# Patient Record
Sex: MALE | Race: OTHER | NOT HISPANIC OR LATINO | URBAN - METROPOLITAN AREA
[De-identification: names, ages, dates, MRNs, and addresses within clinical notes are randomized per-mention and may not be internally consistent; named-entity substitution may affect disease eponyms.]

---

## 2024-05-20 ENCOUNTER — OUTPATIENT (OUTPATIENT)
Dept: OUTPATIENT SERVICES | Facility: HOSPITAL | Age: 19
LOS: 1 days | End: 2024-05-20

## 2024-05-20 VITALS
DIASTOLIC BLOOD PRESSURE: 87 MMHG | OXYGEN SATURATION: 98 % | TEMPERATURE: 98 F | WEIGHT: 205.03 LBS | HEART RATE: 78 BPM | SYSTOLIC BLOOD PRESSURE: 137 MMHG | HEIGHT: 66 IN | RESPIRATION RATE: 16 BRPM

## 2024-05-20 DIAGNOSIS — M26.02 MAXILLARY HYPOPLASIA: ICD-10-CM

## 2024-05-20 DIAGNOSIS — Z90.89 ACQUIRED ABSENCE OF OTHER ORGANS: Chronic | ICD-10-CM

## 2024-05-20 DIAGNOSIS — M26.03 MANDIBULAR HYPERPLASIA: ICD-10-CM

## 2024-05-20 LAB
BLD GP AB SCN SERPL QL: NEGATIVE — SIGNIFICANT CHANGE UP
HCT VFR BLD CALC: 46.7 % — SIGNIFICANT CHANGE UP (ref 39–50)
HGB BLD-MCNC: 16.2 G/DL — SIGNIFICANT CHANGE UP (ref 13–17)
MANUAL SMEAR VERIFICATION: SIGNIFICANT CHANGE UP
MCHC RBC-ENTMCNC: 28.9 PG — SIGNIFICANT CHANGE UP (ref 27–34)
MCHC RBC-ENTMCNC: 34.7 GM/DL — SIGNIFICANT CHANGE UP (ref 32–36)
MCV RBC AUTO: 83.2 FL — SIGNIFICANT CHANGE UP (ref 80–100)
NRBC # BLD: 0 /100 WBCS — SIGNIFICANT CHANGE UP (ref 0–0)
NRBC # FLD: 0 K/UL — SIGNIFICANT CHANGE UP (ref 0–0)
PLAT MORPH BLD: ABNORMAL
PLATELET # BLD AUTO: SIGNIFICANT CHANGE UP K/UL (ref 150–400)
PLATELET CLUMP BLD QL SMEAR: ABNORMAL
PLATELET COUNT - ESTIMATE: ABNORMAL
RBC # BLD: 5.61 M/UL — SIGNIFICANT CHANGE UP (ref 4.2–5.8)
RBC # FLD: 12.1 % — SIGNIFICANT CHANGE UP (ref 10.3–14.5)
RBC BLD AUTO: SIGNIFICANT CHANGE UP
RH IG SCN BLD-IMP: POSITIVE — SIGNIFICANT CHANGE UP
RH IG SCN BLD-IMP: POSITIVE — SIGNIFICANT CHANGE UP
WBC # BLD: 6.9 K/UL — SIGNIFICANT CHANGE UP (ref 3.8–10.5)
WBC # FLD AUTO: 6.9 K/UL — SIGNIFICANT CHANGE UP (ref 3.8–10.5)

## 2024-05-20 NOTE — H&P PST ADULT - NSANTHOSAYNRD_GEN_A_CORE
No. KAMRYN screening performed.  STOP BANG Legend: 0-2 = LOW Risk; 3-4 = INTERMEDIATE Risk; 5-8 = HIGH Risk

## 2024-05-20 NOTE — H&P PST ADULT - HISTORY OF PRESENT ILLNESS
18 yr old male with preop dx of mandibular hyperplasia, maxillary hypoplasia presents to have PSt eval for Maxillary lefort 1 osteotomy, Bilateral sagittal split osteotomies.

## 2024-05-20 NOTE — H&P PST ADULT - PROBLEM SELECTOR PLAN 1
Scheduled for Maxillary lefort 1 Osteotomy, Bilateral sagittal split osteotomies on 5/29/24.  Pre-op instructions provided. Pt given verbal and written instructions with teach back on chlorhexidine shampoo and pepcid. Pt verbalized understanding with return demonstration. Scheduled for Maxillary lefort 1 Osteotomy, Bilateral sagittal split osteotomies on 5/29/24.  Pre-op instructions provided. Pt given verbal and written instructions with teach back on chlorhexidine shampoo and pepcid. Pt verbalized understanding with return demonstration.  CBC, TYPE/ABO done and results pending.

## 2024-05-20 NOTE — H&P PST ADULT - ENMT COMMENTS
denies loose teeth, No loose teeth, Mallampati II, Preop dx Mandibular Hyperplasia, Maxillary Hypoplasia No loose teeth, Mallampati III, Preop dx Mandibular Hyperplasia, Maxillary Hypoplasia

## 2024-05-29 ENCOUNTER — INPATIENT (INPATIENT)
Facility: HOSPITAL | Age: 19
LOS: 0 days | Discharge: HOME | End: 2024-05-30
Attending: ORAL & MAXILLOFACIAL SURGERY | Admitting: ORAL & MAXILLOFACIAL SURGERY

## 2024-05-29 VITALS
RESPIRATION RATE: 16 BRPM | HEART RATE: 80 BPM | WEIGHT: 205.03 LBS | SYSTOLIC BLOOD PRESSURE: 134 MMHG | TEMPERATURE: 98 F | DIASTOLIC BLOOD PRESSURE: 91 MMHG | OXYGEN SATURATION: 99 % | HEIGHT: 66 IN

## 2024-05-29 DIAGNOSIS — Z90.89 ACQUIRED ABSENCE OF OTHER ORGANS: Chronic | ICD-10-CM

## 2024-05-29 DIAGNOSIS — M26.02 MAXILLARY HYPOPLASIA: ICD-10-CM

## 2024-05-29 PROBLEM — Z87.09 PERSONAL HISTORY OF OTHER DISEASES OF THE RESPIRATORY SYSTEM: Chronic | Status: ACTIVE | Noted: 2024-05-20

## 2024-05-29 PROBLEM — M26.03 MANDIBULAR HYPERPLASIA: Chronic | Status: ACTIVE | Noted: 2024-05-20

## 2024-05-29 RX ORDER — OXYCODONE HYDROCHLORIDE 5 MG/1
5 TABLET ORAL EVERY 4 HOURS
Refills: 0 | Status: DISCONTINUED | OUTPATIENT
Start: 2024-05-29 | End: 2024-05-30

## 2024-05-29 RX ORDER — ONABOTULINUMTOXINA 100 UNIT
200 VIAL (EA) INJECTION ONCE
Refills: 0 | Status: DISCONTINUED | OUTPATIENT
Start: 2024-05-29 | End: 2024-05-30

## 2024-05-29 RX ORDER — ACETAMINOPHEN 500 MG
650 TABLET ORAL EVERY 6 HOURS
Refills: 0 | Status: DISCONTINUED | OUTPATIENT
Start: 2024-05-29 | End: 2024-05-30

## 2024-05-29 RX ORDER — SODIUM CHLORIDE 0.65 %
1 AEROSOL, SPRAY (ML) NASAL
Refills: 0 | Status: DISCONTINUED | OUTPATIENT
Start: 2024-05-29 | End: 2024-05-30

## 2024-05-29 RX ORDER — AMOXICILLIN 250 MG/5ML
35 SUSPENSION, RECONSTITUTED, ORAL (ML) ORAL
Qty: 3 | Refills: 0
Start: 2024-05-29 | End: 2024-06-04

## 2024-05-29 RX ORDER — MORPHINE SULFATE 50 MG/1
2 CAPSULE, EXTENDED RELEASE ORAL ONCE
Refills: 0 | Status: DISCONTINUED | OUTPATIENT
Start: 2024-05-29 | End: 2024-05-30

## 2024-05-29 RX ORDER — FLUTICASONE PROPIONATE 50 MCG
1 SPRAY, SUSPENSION NASAL
Qty: 0 | Refills: 0 | DISCHARGE
Start: 2024-05-29

## 2024-05-29 RX ORDER — IBUPROFEN 200 MG
30 TABLET ORAL
Qty: 840 | Refills: 0
Start: 2024-05-29 | End: 2024-06-04

## 2024-05-29 RX ORDER — SODIUM CHLORIDE 9 MG/ML
1000 INJECTION, SOLUTION INTRAVENOUS
Refills: 0 | Status: DISCONTINUED | OUTPATIENT
Start: 2024-05-29 | End: 2024-05-30

## 2024-05-29 RX ORDER — METOCLOPRAMIDE HCL 10 MG
10 TABLET ORAL ONCE
Refills: 0 | Status: DISCONTINUED | OUTPATIENT
Start: 2024-05-29 | End: 2024-05-30

## 2024-05-29 RX ORDER — ACETAMINOPHEN 500 MG
4 TABLET ORAL
Qty: 112 | Refills: 0
Start: 2024-05-29 | End: 2024-06-04

## 2024-05-29 RX ORDER — ONDANSETRON 8 MG/1
4 TABLET, FILM COATED ORAL EVERY 8 HOURS
Refills: 0 | Status: DISCONTINUED | OUTPATIENT
Start: 2024-05-29 | End: 2024-05-30

## 2024-05-29 RX ORDER — CHLORHEXIDINE GLUCONATE 213 G/1000ML
15 SOLUTION TOPICAL
Qty: 1 | Refills: 0
Start: 2024-05-29 | End: 2024-06-04

## 2024-05-29 RX ORDER — OXYCODONE HYDROCHLORIDE 5 MG/1
5 TABLET ORAL
Qty: 80 | Refills: 0
Start: 2024-05-29 | End: 2024-06-01

## 2024-05-29 RX ORDER — CHLORHEXIDINE GLUCONATE 213 G/1000ML
15 SOLUTION TOPICAL
Refills: 0 | Status: DISCONTINUED | OUTPATIENT
Start: 2024-05-29 | End: 2024-05-30

## 2024-05-29 RX ORDER — PENICILLIN V POTASSIUM 250 MG
2 TABLET ORAL EVERY 4 HOURS
Refills: 0 | Status: DISCONTINUED | OUTPATIENT
Start: 2024-05-29 | End: 2024-05-30

## 2024-05-29 RX ORDER — IBUPROFEN 200 MG
600 TABLET ORAL EVERY 6 HOURS
Refills: 0 | Status: DISCONTINUED | OUTPATIENT
Start: 2024-05-29 | End: 2024-05-30

## 2024-05-29 RX ORDER — SODIUM CHLORIDE 0.65 %
2 AEROSOL, SPRAY (ML) NASAL
Qty: 0 | Refills: 0 | DISCHARGE
Start: 2024-05-29

## 2024-05-29 RX ORDER — OXYCODONE HYDROCHLORIDE 5 MG/1
10 TABLET ORAL EVERY 4 HOURS
Refills: 0 | Status: DISCONTINUED | OUTPATIENT
Start: 2024-05-29 | End: 2024-05-30

## 2024-05-29 RX ORDER — OXYMETAZOLINE HYDROCHLORIDE 0.5 MG/ML
2 SPRAY NASAL
Qty: 0 | Refills: 0 | DISCHARGE
Start: 2024-05-29

## 2024-05-29 RX ORDER — FLUTICASONE PROPIONATE 50 MCG
1 SPRAY, SUSPENSION NASAL ONCE
Refills: 0 | Status: DISCONTINUED | OUTPATIENT
Start: 2024-05-29 | End: 2024-05-30

## 2024-05-29 RX ORDER — OXYMETAZOLINE HYDROCHLORIDE 0.5 MG/ML
2 SPRAY NASAL EVERY 12 HOURS
Refills: 0 | Status: DISCONTINUED | OUTPATIENT
Start: 2024-05-29 | End: 2024-05-30

## 2024-05-29 RX ADMIN — Medication 100 MILLION UNIT(S): at 17:00

## 2024-05-29 RX ADMIN — Medication 650 MILLIGRAM(S): at 21:35

## 2024-05-29 RX ADMIN — Medication 100 MILLION UNIT(S): at 22:51

## 2024-05-29 RX ADMIN — OXYMETAZOLINE HYDROCHLORIDE 2 SPRAY(S): 0.5 SPRAY NASAL at 17:40

## 2024-05-29 RX ADMIN — CHLORHEXIDINE GLUCONATE 15 MILLILITER(S): 213 SOLUTION TOPICAL at 17:55

## 2024-05-29 RX ADMIN — Medication 600 MILLIGRAM(S): at 18:00

## 2024-05-29 RX ADMIN — Medication 600 MILLIGRAM(S): at 17:46

## 2024-05-29 NOTE — DISCHARGE NOTE PROVIDER - NSDCCPCAREPLAN_GEN_ALL_CORE_FT
PRINCIPAL DISCHARGE DIAGNOSIS  Diagnosis: Maxillary hypoplasia  Assessment and Plan of Treatment:       SECONDARY DISCHARGE DIAGNOSES  Diagnosis: Mandibular hyperplasia  Assessment and Plan of Treatment:

## 2024-05-29 NOTE — DISCHARGE NOTE PROVIDER - HOSPITAL COURSE
5/29/24: 18 year old male patient presents here today for LeFort 1 maxillary osteotomies and bilateral sagittal split osteotomies of the mandible, and injection of botox in the bilateral masseter and temporalis muscles. No complications, patient is progressing well. 5/29/24: 18 year old male patient presents here today for LeFort 1 maxillary osteotomies and bilateral sagittal split osteotomies of the mandible, and injection of botox in the bilateral masseter and temporalis muscles. No complications, patient is progressing well.     5/30/24: Patient examined at bedside this morning. No acute events overnight. Patient passed trial of void, tolerates oral fluid intake, progressing well without acute events. Patient is ready for discharge.

## 2024-05-29 NOTE — DISCHARGE NOTE PROVIDER - CARE PROVIDER_API CALL
Michael Whitley  Oral/Maxillofacial Surgery  [address], [address]  [place] [address]  Phone: [phone]  Fax: [phone]  Follow Up Time: 1 week

## 2024-05-29 NOTE — PROVIDER CONTACT NOTE (FALL NOTIFICATION) - SITUATION
patient was ambulating to the restroom and leaned on the curtain thinking it was a wall and stubnled to the ground

## 2024-05-29 NOTE — DISCHARGE NOTE PROVIDER - NSDCMRMEDTOKEN_GEN_ALL_CORE_FT
acetaminophen 160 mg/5 mL oral suspension: 4 milliliter(s) orally every 6 hours  amoxicillin 125 mg/5 mL oral liquid: 35 milliliter(s) orally every 12 hours  fluticasone 50 mcg/inh nasal spray: 1 spray(s) nasal once  ibuprofen 100 mg/5 mL oral suspension: 30 milliliter(s) orally every 6 hours  oxyCODONE 5 mg/5 mL oral solution: 5 milliliter(s) orally every 6 hours as needed for  moderate pain MDD: 20mg  oxymetazoline 0.05% nasal spray: 2 spray(s) nasal every 12 hours  Peridex 0.12% mucous membrane liquid: 15 milliliter(s) orally every 12 hours swish and spit for 30 seconds, repeat twice daily for 7 days  sodium chloride 0.65% nasal spray: 2 spray(s) nasal every 2 hours as needed for  congestion   acetaminophen 160 mg/5 mL oral suspension: 4 milliliter(s) orally every 6 hours  amoxicillin 125 mg/5 mL oral liquid: 35 milliliter(s) orally every 12 hours  fluticasone 50 mcg/inh nasal spray: 1 spray(s) nasal once  ibuprofen 100 mg/5 mL oral suspension: 30 milliliter(s) orally every 6 hours  oxyCODONE 5 mg/5 mL oral solution: 5 milliliter(s) orally every 6 hours as needed for  moderate pain MDD: 20mg  Peridex 0.12% mucous membrane liquid: 15 milliliter(s) orally every 12 hours swish and spit for 30 seconds, repeat twice daily for 7 days  sodium chloride 0.65% nasal spray: 2 spray(s) nasal every 2 hours as needed for  congestion

## 2024-05-30 VITALS
OXYGEN SATURATION: 100 % | RESPIRATION RATE: 18 BRPM | SYSTOLIC BLOOD PRESSURE: 125 MMHG | HEART RATE: 67 BPM | DIASTOLIC BLOOD PRESSURE: 65 MMHG | TEMPERATURE: 98 F

## 2024-05-30 RX ORDER — OXYMETAZOLINE HYDROCHLORIDE 0.5 MG/ML
2 SPRAY NASAL
Qty: 1 | Refills: 0
Start: 2024-05-30 | End: 2024-05-30

## 2024-05-30 RX ORDER — SODIUM CHLORIDE 0.65 %
2 AEROSOL, SPRAY (ML) NASAL
Qty: 1 | Refills: 0
Start: 2024-05-30 | End: 2024-06-05

## 2024-05-30 RX ORDER — ACETAMINOPHEN 500 MG
20 TABLET ORAL
Qty: 560 | Refills: 0
Start: 2024-05-30 | End: 2024-06-05

## 2024-05-30 RX ADMIN — SODIUM CHLORIDE 100 MILLILITER(S): 9 INJECTION, SOLUTION INTRAVENOUS at 00:17

## 2024-05-30 RX ADMIN — Medication 650 MILLIGRAM(S): at 04:00

## 2024-05-30 RX ADMIN — CHLORHEXIDINE GLUCONATE 15 MILLILITER(S): 213 SOLUTION TOPICAL at 05:23

## 2024-05-30 RX ADMIN — Medication 600 MILLIGRAM(S): at 05:23

## 2024-05-30 RX ADMIN — Medication 600 MILLIGRAM(S): at 00:15

## 2024-05-30 RX ADMIN — Medication 100 MILLION UNIT(S): at 05:23

## 2024-05-30 RX ADMIN — OXYCODONE HYDROCHLORIDE 10 MILLIGRAM(S): 5 TABLET ORAL at 10:18

## 2024-05-30 RX ADMIN — OXYMETAZOLINE HYDROCHLORIDE 2 SPRAY(S): 0.5 SPRAY NASAL at 05:23

## 2024-05-30 RX ADMIN — Medication 100 MILLION UNIT(S): at 02:14

## 2024-05-30 NOTE — PATIENT PROFILE ADULT - FALL HARM RISK - HARM RISK INTERVENTIONS

## 2024-05-30 NOTE — PROGRESS NOTE ADULT - ASSESSMENT
18 year old male patient presents here today for LeFort 1 maxillary osteotomies and bilateral sagittal split osteotomies of the mandible, and injection of botox in the bilateral masseter and temporalis muscles (5/29/24). No complications, patient is progressing well.      PLAN:  -IV Abx  -mulimodal pain control  -OOBTC  -encourage PO intake  -plan for discharge today    OMFS  #[id number]  Available on Teams

## 2024-05-30 NOTE — DISCHARGE NOTE NURSING/CASE MANAGEMENT/SOCIAL WORK - NSDCPEFALRISK_GEN_ALL_CORE
For information on Fall & Injury Prevention, visit: [email or web address] OR  [email or web address] OR  https://www.cdc.gov/steadi/patient.html

## 2024-05-30 NOTE — PROGRESS NOTE ADULT - SUBJECTIVE AND OBJECTIVE BOX
18 year old male patient presents here today for LeFort 1 maxillary osteotomies and bilateral sagittal split osteotomies of the mandible, and injection of botox in the bilateral masseter and temporalis muscles (5/29/24). No complications, patient is progressing well.     INTERVAL EVENTS:  Patient examined at bedside this morning. No acute events overnight. Patient passed trial of void, tolerates oral fluid intake, progressing well without acute events.    ICU Vital Signs Last 24 Hrs  T(C): 36.8 (30 May 2024 02:00), Max: 37.1 (29 May 2024 16:05)  T(F): 98.2 (30 May 2024 02:00), Max: 98.8 (29 May 2024 16:05)  HR: 81 (30 May 2024 02:00) (59 - 96)  BP: 135/61 (30 May 2024 02:00) (127/83 - 159/88)  BP(mean): 95 (29 May 2024 18:15) (90 - 106)  ABP: 144/70 (29 May 2024 18:15) (141/68 - 165/73)  ABP(mean): 93 (29 May 2024 18:15) (92 - 102)  RR: 18 (30 May 2024 02:00) (15 - 23)  SpO2: 100% (30 May 2024 02:00) (94% - 100%)    O2 Parameters below as of 30 May 2024 02:00  Patient On (Oxygen Delivery Method): room air        I&O's Detail    29 May 2024 07:01  -  30 May 2024 07:00  --------------------------------------------------------  IN:    Lactated Ringers: 300 mL    Oral Fluid: 50 mL  Total IN: 350 mL    OUT:    Indwelling Catheter - Urethral (mL): 325 mL    Voided (mL): 0 mL  Total OUT: 325 mL    Total NET: 25 mL      MEDICATIONS  (STANDING):  acetaminophen   Oral Liquid .. 650 milliGRAM(s) Oral every 6 hours  chlorhexidine 0.12% Liquid 15 milliLiter(s) Swish and Spit two times a day  fluticasone propionate 50 MICROgram(s)/spray Nasal Spray 1 Spray(s) Both Nostrils once  ibuprofen  Suspension. 600 milliGRAM(s) Oral every 6 hours  lactated ringers. 1000 milliLiter(s) (100 mL/Hr) IV Continuous <Continuous>  morphine  - Injectable 2 milliGRAM(s) IV Push once  onabotulinumtoxinA Injectable 200 Unit(s) IntraMuscular once  oxymetazoline 0.05% Nasal Spray 2 Spray(s) Both Nostrils every 12 hours  penicillin   G  potassium  IVPB 2 Million Unit(s) IV Intermittent every 4 hours    MEDICATIONS  (PRN):  metoclopramide Injectable 10 milliGRAM(s) IV Push once PRN if zofran is ineffective  ondansetron Injectable 4 milliGRAM(s) IV Push every 8 hours PRN Nausea and/or Vomiting  oxyCODONE    Solution 5 milliGRAM(s) Oral every 4 hours PRN Moderate Pain (4 - 6)  oxyCODONE    Solution 10 milliGRAM(s) Oral every 4 hours PRN Severe Pain (7 - 10)  sodium chloride 0.65% Nasal 1 Spray(s) Both Nostrils every 2 hours PRN Nasal Congestion    PE  Gen: AAOX3, NAD  HEEN: b/l edema c/w postoperative course, jaw bra in place,   Maxillofacial: midline on, elastic bands intact b/l, incision sites c/i/h, no active drainage, stable b/l posterior occlusion
ANESTHESIA POSTOP CHECK    18y Male POSTOP DAY 1 S/P Midface reconstruction, Lefort I    Vital Signs Last 24 Hrs  T(C): 36.4 (30 May 2024 10:02), Max: 37.1 (29 May 2024 16:05)  T(F): 97.6 (30 May 2024 10:02), Max: 98.8 (29 May 2024 16:05)  HR: 67 (30 May 2024 10:02) (59 - 99)  BP: 125/65 (30 May 2024 10:02) (116/78 - 159/88)  BP(mean): 95 (29 May 2024 18:15) (90 - 106)  RR: 18 (30 May 2024 10:02) (15 - 23)  SpO2: 100% (30 May 2024 10:02) (94% - 100%)    Parameters below as of 30 May 2024 10:02  Patient On (Oxygen Delivery Method): room air      I&O's Summary    29 May 2024 07:01  -  30 May 2024 07:00  --------------------------------------------------------  IN: 1650 mL / OUT: 825 mL / NET: 825 mL        [x] NO APPARENT ANESTHESIA COMPLICATIONS      Comments:

## 2024-05-30 NOTE — DISCHARGE NOTE NURSING/CASE MANAGEMENT/SOCIAL WORK - PATIENT PORTAL LINK FT
You can access the FollowMyHealth Patient Portal offered by [facility] by registering at the following website: [email or web address]. By joining [facility]’s FollowMyHealth portal, you will also be able to view your health information using other applications (apps) compatible with our system.

## 2024-11-25 NOTE — PATIENT PROFILE ADULT - OVER THE PAST TWO WEEKS, HAVE YOU FELT LITTLE INTEREST OR PLEASURE IN DOING THINGS?
[facility]/Population Health Chart Review & Patient Outreach Details For Medication Adherence Project    Reason for Outreach Encounter: 3rd Party payor non-compliance report ([employer], [employer], C, etc)  2.  Patient Outreach Method: Reviewed patient chart   3.   Medication in question:   Diabetes Medications               metFORMIN (GLUCOPHAGE) 500 MG tablet Take 1 tablet (500 mg total) by mouth 2 (two) times daily with meals.                 Metformin  last filled  11/1/24 for 90 day supply    4.  Reviewed and or Updates Made To: Patient Chart  5. Outreach Outcomes and/or actions taken: Patient filled medication and is on track to be adherent  Additional Notes:        no